# Patient Record
(demographics unavailable — no encounter records)

---

## 2018-03-22 NOTE — DIAGNOSTIC IMAGING REPORT
EXAM: CHEST SINGLE (PORTABLE), AP 1 view

INDICATION: Left-sided chest pain

COMPARISON: None



FINDINGS:

LINES/TUBES: None



LUNGS: No consolidations or edema. 



PLEURA: No effusions or pneumothorax.



HEART AND MEDIASTINUM: Normal size and contour.



BONES AND SOFT TISSUES: No acute findings. 



IMPRESSION:

No acute thoracic abnormality.







Signed by: Dr. Cindy Sharma M.D. on 3/22/2018 11:53 PM

## 2018-03-23 NOTE — XMS REPORT
Patient Summary Document

 Created on: 2018



ROBERT GAMA

External Reference #: [id number]

: 1962

Sex: Male



Demographics







 Address  [address] [place] [address]

 

 Home Phone  [phone]

 

 Preferred Language  Unknown

 

 Marital Status  Unknown

 

 Zoroastrian Affiliation  Unknown

 

 Race  Unknown

 

 Additional Race(s)  

 

 Ethnic Group  Unknown





Author







 Author  [facility]

 

 Address  Unknown

 

 Phone  Unavailable







Care Team Providers







 Care Team Member Name  Role  Phone

 

 SWEET ROBIN  Unavailable  Unavailable







Problems

This patient has no known problems.



Allergies, Adverse Reactions, Alerts

This patient has no known allergies or adverse reactions.



Medications

This patient has no known medications.



Results







 Test Description  Test Time  Test Comments  Text Results  Atomic Results  
Result Comments









 CHEST SINGLE (PORTABLE)            [place] [address]      Patient Name: ROBERT GAMA   MR #: [id number]    : 1962 Age/Sex: 55/M  Acct #: [id number] 
Req #: [id number]  Adm Physician:     Ordered by: ROBIN RAMEY MD  Report #: 
[id number]   Location: ER  Room/Bed:     ________________________________________
___________________________________________________________    Procedure: [id number]
[id number] DX/CHEST SINGLE (PORTABLE)  Exam Date:                             Exam 
Time:        REPORT STATUS: Signed    EXAM: CHEST SINGLE (PORTABLE), AP 1 view 
  INDICATION: Left-sided chest pain   COMPARISON: None      FINDINGS:   LINES/
TUBES: None      LUNGS: No consolidations or edema.       PLEURA: No effusions 
or pneumothorax.      HEART AND MEDIASTINUM: Normal size and contour.      
BONES AND SOFT TISSUES: No acute findings.       IMPRESSION:   No acute 
thoracic abnormality.            Signed by: Dr. Jasmyn Sharma M.D. on 3/22/
2018 11:53 PM        Dictated By: JASMYN SHARMA MD  Electronically Signed By: 
JASMYN SHARMA MD on 03/22/18 2353  Transcribed By: RENETTA on 18    
   COPY TO:   ROBIN RAMEY MD           

 

 CT BRAIN WO            [place] [address]      Patient Name: ROBERT GAMA   MR #
: [id number]    : 1962 Age/Sex: 55/M  Acct #: [id number] Req #: [id number]
[id number]  Adm Physician:     Ordered by: ROBIN RAMEY MD  Report #: [id number] 
  Location: ER  Room/Bed:     __________________________________________________
_________________________________________________    Procedure: [id number] CT/CT 
BRAIN WO  Exam Date:                             Exam Time:        REPORT STATUS
: Signed    EXAMINATION: Head CT without contrast.           HISTORY:Headache, 
hypertension.      COMPARISON:None.   TECHNIQUE: Multidetector axial images 
were obtained from the foramen magnum to   the vertex without contrast. The 
images were reconstructed using brain and bone   algorithms.  Thin section 
brain images were reformatted into coronal and   sagittal planes.     
Intravenous contrast: None        IMAGE QUALITY: Acceptable.      FINDINGS:    
   Skull/scalp: No lytic or blastic. lesions.  No surgical changes.       
Parenchyma: Focal hypodensity in right paramedian aspect of the stiven   
represents an old lacunar infarct.   No acute hemorrhage, mass or acute major 
vascular territorial infarct.       Arteries: No density suggestive of 
thrombosis.          Dural sinuses: No abnormal density suggestive of 
thrombosis.        Ventricles: No hydrocephalus or displacement.       Extra-
axial spaces: No abnormal density.         Brain volume: Moderate generalized 
cerebral volume loss, advanced for   patient's given age.       Craniocervical 
junction: No mass, Chiari malformation, or basilar   invagination.       Sella: 
No mass.        Paranasal/mastoid sinuses: Under pneumatization and sclerosis 
of left   mastoid air cells possibly related to chronic inflammatory process.  
    IMPRESSION:      1. No acute intracranial abnormality, particularly no 
acute hemorrhage, mass or   acute major vascular territorial infarct.      2. 
Old lacunar infarct in the stiven.      3. Moderate generalized cerebral volume 
loss, advanced for patient's given age.      Signed by: Dr. Thi Allen M.D. on 3/22/2018 11:58 PM        Dictated By: HTI ALLEN MD  
Electronically Signed By: THI ALLEN MD on 18  Transcribed By: 
RENETTA on 18       COPY TO:   ROBIN RAMEY MD

## 2018-03-23 NOTE — CONSULTATION
DATE OF CONSULTATION:  2018 



ATTENDING PHYSICIAN:  Dr. Zeyad Diallo.



Thank you so much for asking me to see this nice man in consultation. 



Mr. 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 

byproducts, who presented to the emergency room with a complaint of 

left-sided chest discomfort.



HISTORY OF PRESENT ILLNESS:  The patient recounts that his family doctor 

had recently increased his thyroid supplement.  He reviewed his home 

medications and found that although he was prescribed lisinopril in 

January, he has not been taking the medicine since that time.  He noted 

during the day on the  that he had head fullness.



PAST MEDICAL HISTORY:  Significant for cerebrovascular accident about 2017 when he was hospitalized at [facility].  He reports it 

had some effect on his left-side arm and leg strength and left side of his 

face, but he reports that he has recovered quite well and he continues to 

work.  He denies any other hospitalizations or surgeries.  He tells me that 

diabetes runs in his family, and he takes a home medication combination of 

metformin, levothyroxine, fenofibrate, simvastatin.  He has not been using 

the lisinopril, as mentioned above. 



PERSONAL AND SOCIAL HISTORY:  The patient reports he has smoked in the past 

and continues to smoke sometimes, "not every day." 



FAMILY HISTORY:  His mother is alive with diabetes.  His father  of 

gunshot wound.



PHYSICAL EXAMINATION:  

GENERAL:  Shows a pleasant, alert man.  

VITALS:  Current blood pressure 139/65, although presenting blood pressure 

in the emergency room was 200/100.  

HEENT:  Unremarkable. 

NECK:  No jugular venous distention, no bruits. 

THORAX:   Heart sounds S1 and S2 are equal, no murmurs.  Lungs are clear.  

Chest nontender to palpation. 

ABDOMEN:  Protuberant.  Normal bowel sounds. 

EXTREMITIES:  With no cyanosis, clubbing or edema.  



EKG shows sinus rhythm.  Troponins are normal.  Glucose 147.  Potassium 

3.5.  A chest x-ray is unremarkable.



ASSESSMENT:  

1. Chest discomfort, etiology not clear.

2. Hypertension, uncontrolled on presentation.

3. Type 2 adult-onset diabetes.  

4. Hyperlipidemia.

5. Previous cerebrovascular accident.  



PLAN:   Agree with management of blood pressure and lipids.  Will check 

echocardiogram and Lexiscan Myoview.  Further management will be based on 

clinical course.  



Thank you for asking me to see him in consultation.



 





DD:  2018 12:34

DT:  2018 13:15

Job#:  [id number]

## 2018-03-23 NOTE — HISTORY AND PHYSICAL
PRIMARY CARE PHYSICIAN:  Dr. Burton



CHIEF COMPLAINT:  Chest pain and elevated blood pressure.



HISTORY OF PRESENT ILLNESS:  A 55-year-old man with a history of 

hypertension and hypothyroidism, who recently had his thyroid medicine 

adjusted from 125 up to 200 last week by his primary care doctor, now 

developing chest discomfort on the left side.  It is kind of a sharp 

discomfort with systolic blood pressure in the 180s.  Therefore, he came to 

the hospital.  In the emergency room, his blood pressure was as high as 

202/102.  The patient had associated headache and chest pain.  Started on 

nitroglycerin patch and treated with IV labetalol.  Admitted for further 

evaluation and management.  Currently, the patient continues to have 

left-sided chest discomfort.  He has never had a stress test before.  

Denies any nausea or vomiting.  Denies any shortness of breath.  There is 

no radiation of the pain.  



PAST MEDICAL HISTORY:  Diabetes mellitus, type 2, stroke, hypothyroidism, 

hyperlipidemia, hypertriglyceridemia.  



PAST SURGICAL HISTORY:  None.



ALLERGIES:  PER ELECTRONIC MEDICAL RECORD.



FAMILY HISTORY/SOCIAL HISTORY:  The patient is .  He has 2 children. 

 Occasional alcohol.  Smokes cigarettes when he drinks.



MEDICATIONS:  Per electronic medical record.



REVIEW OF SYSTEMS:  Denies any dizziness or headache at this time.



LABS:  Reviewed.



MEDICATIONS:  Reviewed.



ASSESSMENT AND PLAN:  This is a 55-year-old man with:

1.  Hypertensive emergency with headache:  He was placed on nitroglycerin 

patch.  Will start him on calcium channel blocker and beta blocker now.  

2.  Chest discomfort:  Chest pain persists, even though the blood pressure 

has improved although there is less chest pain.  He has never had a stress 

test before.  He does have diabetes increasing of his risk of premature 

coronary artery disease.  Therefore, will consult cardiology for 

evaluation.  Will obtain a 2-D echocardiogram.  

3.  Diabetes mellitus, type 2:  Will obtain hemoglobin A1c and lipid panel.

4.  Hypothyroidism:  TSH is elevated at 14.86.  The patient says that his 

Synthroid was increased from 125 to 200 last week by his primary care 

doctor.  Therefore, will obtain thyroid function testing.

5.  Overweight state:  Body mass index 28.3.  Needs caloric restriction 

outpatient, which will improve diabetes profile.

6.  Hyperlipidemia/hypertriglyceridemia:  Obtain lipid panel.  Will resume 

his fenofibrate and statin.

7.  Prophylaxis:  Will use Lovenox and Pepcid.

8.  Disposition:  Cardiology consultation.  Echocardiogram.  











DD:  03/23/2018 07:22

DT:  03/23/2018 07:29

Job#:  [id number]

## 2018-03-23 NOTE — DIAGNOSTIC IMAGING REPORT
EXAMINATION: Head CT without contrast.  





HISTORY:Headache, hypertension.



COMPARISON:None.

TECHNIQUE: Multidetector axial images were obtained from the foramen magnum to

the vertex without contrast. The images were reconstructed using brain and bone

algorithms.  Thin section brain images were reformatted into coronal and

sagittal planes.  

Intravenous contrast: None  



IMAGE QUALITY: Acceptable.



FINDINGS:

    Skull/scalp: No lytic or blastic. lesions.  No surgical changes.

    Parenchyma: Focal hypodensity in right paramedian aspect of the stiven

represents an old lacunar infarct.

No acute hemorrhage, mass or acute major vascular territorial infarct.

    Arteries: No density suggestive of thrombosis.   

    Dural sinuses: No abnormal density suggestive of thrombosis. 

    Ventricles: No hydrocephalus or displacement.

    Extra-axial spaces: No abnormal density.  

    Brain volume: Moderate generalized cerebral volume loss, advanced for

patient's given age.

    Craniocervical junction: No mass, Chiari malformation, or basilar

invagination.

    Sella: No mass. 

    Paranasal/mastoid sinuses: Under pneumatization and sclerosis of left

mastoid air cells possibly related to chronic inflammatory process.



IMPRESSION:



1. No acute intracranial abnormality, particularly no acute hemorrhage, mass or

acute major vascular territorial infarct.



2. Old lacunar infarct in the stiven.



3. Moderate generalized cerebral volume loss, advanced for patient's given age.



Signed by: Dr. Thi Allen M.D. on 3/22/2018 11:58 PM No

## 2018-03-24 NOTE — CARDIOLOGY REPORT
DATE OF STUDY:  March 23, 2018 



LEXISCAN MYOVIEW



ATTENDING PHYSICIAN:  Dr. Zeyad Diallo.



The patient had resting perfusion images after an injection of 11 mCi of 

technetium 99M Myoview.  Later due to inability to exercises given Lexiscan 

0.4 mg intravenously and shortly afterwards 32.8 mCi of technetium 99M 

Myoview.  Perfusion images were taken by rotational tomography.



Comparison of resting and Lexiscan stress images shows no evidence of any 

perfusion defect. Uptake is smooth and regular.  No defects noted.  

Additionally gated wall-motion images were obtained and calculated ejection 

fraction normal at 50% without regional wall motion abnormalities.



FINAL IMPRESSION

1. Normal Lexiscan Myoview perfusion.

2. Normal left ventricular function, calculated ejection fraction of 

50%.









DD:  03/24/2018 16:03

DT:  03/24/2018 17:07

Job#:  [id number]



cc:Zeyad Diallo MD

## 2018-03-25 NOTE — PROGRESS NOTE
DATE:  March 24, 2018 



MEDICINE PROGRESS NOTE



TIME:  03:50 p.m.



SUBJECTIVE:  No acute events overnight or the stay.  Patient denies further 

chest pain or shortness of breath.  Furthermore, denies nausea, vomiting, 

diarrhea, fatigue,  or claudication. 



PHYSICAL EXAMINATION

VITAL SIGNS:  Temperature 96.9 oral, pulse 74, respiratory rate 20, BP 

124/70, and pulse ox 95% on room air.  Telemetry strip reviewed, noted 

normal sinus rhythm. 

GENERAL APPEARANCE:  This is a tired-appearing man, resting supine in bed. 

HEENT:  Normocephalic, atraumatic. 

CARDIOVASCULAR:  No extra cardiac sounds appreciated, and regular rate and 

rhythm noted.

LUNGS:  Bilateral breath sounds clear to auscultation. 

ABDOMEN:  Soft, nondistended, and nontender. 

EXTREMITIES:  Moves all 4 extremities.  Denies any calf pain. 

SKIN:  Dry. 

PSYCHIATRIC:  Patient with flat affect. 



LABS:  WBC on March 22, 6.06, hemoglobin 13.8, hematocrit 39, and platelet 

counts 320.  Chemistries on same day found the patient with a sodium of 

140, potassium 3.5, chloride 105, carbon dioxide 24, gap of 14.5, BUN 16, 

and creatinine 0.89.  TSH on March 22nd was almost 15.  The next days, the 

values were 7.7; thyroid functions were within normal range. 



MEDICATIONS 

1. Regular insulin per sliding scale a.c. and at bedtime. 

2. Hydralazine 50 mg q. 6 hours. 

3. Lisinopril 10 mg p.o. b.i.d. 

4. Fenofibrate 145 mg p.o. daily. 

5. ASA 81 mg p.o. q.a.m. 

6. Pepcid q. 12 hours IV. 

7. Synthroid 200 mcg daily at 0600. 

8. Zocor 10 mg p.o. at bedtime. 

9. Nifedipine 30 mg at bedtime. 

10. Prophylactic subcutaneous Lovenox 40 mg daily. 

11. P.r.n. 650 mg Tylenol. 

12. P.r.n. dextrose. 

13. P.r.n. Zofran. 

14. P.r.n. morphine sulfate. 



ASSESSMENT AND PLAN:  This is a 55-year-old man with: 

1. Hypertensive emergency with headache:  Patient was treated on arrival 

with nitroglycerin patch and initiation of calcium channel blocker and 

beta blocker were provided.  Over the last 24 hours, the patient's blood 

pressure has been with a systolic range of max systolic of 141 and a 

minimal systolic of 124.  Patient verbalized he was not taking his 

lisinopril.  Extensive education has been provided by staff about 

medication and compliance. 

2. Chest discomfort, resolved.  Cardiology is following.  The patient 

with diabetes mellitus type 2 increasing his risk for coronary artery 

disease.  Cardiology interpretation of stress test is pending. 

3. Diabetes mellitus type 2:  Hemoglobin A1c was 6 on arrival.  Point of 

care glucose ranging from 116 to 147. 

4. Hypothyroidism:  TSH as per labs above.  Synthroid was increased to 

200 mcg per previous note by PCP.  Thyroid functioning is normal per 

values. 

5. Overweight state, BMI 28.3.  Outpatient caloric restriction and 

counseling. 

6. Hyperlipidemia and hypertriglyceridemia:  Fenofibrate and statin 

resumed upon admission.  Patient will continue with his fenofibrate and 

Zocor as outpatient. 





1. Prophylaxis, Lovenox and Pepcid. 

2. Disposition:  Patient counseled this day concerning need to follow up 

with PCP and medication and recommendations upon discharge.  Discharge 

pending approval from cardiology following interpretation of stress test 

completed the night previous. 



Dictated By:  Kwaku Harrington NP









DD:  03/24/2018 15:58

DT:  03/24/2018 16:13

Job#:  [id number]

## 2018-03-26 NOTE — DISCHARGE SUMMARY
PRIMARY DIAGNOSES 

1.  Hypertensive emergency with headache.  

2.  Angina, resolved.

3.  Diabetes mellitus, type 2.

4.  Hypothyroidism.

5.  Overweight.  Body mass index 28.3.

6.  Hyperlipidemia.

7.  Hypertriglyceridemia.



HISTORY OF PRESENT ILLNESS:  This is a 55-year-old man with a history of 

hypertension, hypothyroidism, who recently had his thyroid medication 

adjusted from 125 mcg to 200 mcg last week by his PCP.  Upon presentation 

to the emergency room and 1 day prior, he had developed chest discomfort on 

the left side characterized as sharp with systolic BP of 180.  Upon noting 

the elevated blood pressure, the patient presented to the emergency room.  

Upon presentation, blood pressure was 202/102 with associated headache and 

chest pain.  



IMAGING:  Stress test, Lexiscan Myoview, chest x-ray, brain CT.  



HOSPITAL COURSE:  Upon presentation on the evening of March 22, 2018, the 

patient with elevated BP of 202/102 and was treated with a nitroglycerin 

patch and IV labetalol.  The patient was admitted that a.m. for further 

evaluation and management concerning his continued left-sided chest 

discomfort.  Cardiology was consulted, and workup was initiated the 

following a.m. with stress test commencing the evening of March 23, 2018.  

On March 24, 2018, the patient was cleared by cardiology for discharge.  

The patient had resolution of blood pressure and chest pain.  The patient 

was subsequently discharged in stable condition on the afternoon of 

Saturday, March 24, 2018.



DISCHARGE MEDICATIONS:  The patient was discharged with medications of:

1.  Fenofibrate 145 mg tab 1 p.o. daily.

2.  Hydralazine 25 mg tab times 2 every 6 hours.

3.  Levothyroxine 100 mcg tab 2 tabs p.o. daily.

4.  Lisinopril 10 mg 1 p.o. b.i.d. 

5.  Metformin 500 mg p.o. q.a.m. 

6.  Pressor 10 mg tabs once p.o. daily.



FOLLOWUP:  The patient was instructed to follow up with primary care 

provider, Dr. Davonte Barba, within 3-4 days of discharge.  Return to the 

hospital or other urgent care or emergent care provider should symptoms or 

elevated blood pressure persist.



CONDITION ON DISCHARGE:  The patient was stable and escorted via wheelchair 

to awaiting family transportation. Upon discharge, the patient voiced no 

concerns and denied no new complaints upon leaving the facility premises.  



DICTATED BY WYLAN W. BLAKE, NP





 



 _________________________________

MAURI SAUNDERS MD



DD:  03/26/2018 15:20

DT:  03/26/2018 15:27

Job#:  [id number]